# Patient Record
Sex: MALE | Race: WHITE | NOT HISPANIC OR LATINO | Employment: FULL TIME | URBAN - METROPOLITAN AREA
[De-identification: names, ages, dates, MRNs, and addresses within clinical notes are randomized per-mention and may not be internally consistent; named-entity substitution may affect disease eponyms.]

---

## 2020-11-06 ENCOUNTER — OFFICE VISIT (OUTPATIENT)
Dept: URGENT CARE | Facility: MEDICAL CENTER | Age: 30
End: 2020-11-06
Payer: OTHER MISCELLANEOUS

## 2020-11-06 PROCEDURE — G0383 LEV 4 HOSP TYPE B ED VISIT: HCPCS | Performed by: PHYSICIAN ASSISTANT

## 2020-11-06 PROCEDURE — 90471 IMMUNIZATION ADMIN: CPT | Performed by: PHYSICIAN ASSISTANT

## 2020-11-06 PROCEDURE — 90715 TDAP VACCINE 7 YRS/> IM: CPT

## 2020-11-06 PROCEDURE — 12032 INTMD RPR S/A/T/EXT 2.6-7.5: CPT

## 2020-11-06 PROCEDURE — 99284 EMERGENCY DEPT VISIT MOD MDM: CPT | Performed by: PHYSICIAN ASSISTANT

## 2020-11-09 ENCOUNTER — APPOINTMENT (OUTPATIENT)
Dept: URGENT CARE | Facility: MEDICAL CENTER | Age: 30
End: 2020-11-09
Payer: OTHER MISCELLANEOUS

## 2020-11-09 PROCEDURE — 99213 OFFICE O/P EST LOW 20 MIN: CPT | Performed by: FAMILY MEDICINE

## 2020-11-20 ENCOUNTER — APPOINTMENT (OUTPATIENT)
Dept: URGENT CARE | Facility: MEDICAL CENTER | Age: 30
End: 2020-11-20
Payer: OTHER MISCELLANEOUS

## 2020-11-20 PROCEDURE — 99213 OFFICE O/P EST LOW 20 MIN: CPT | Performed by: PHYSICIAN ASSISTANT

## 2021-06-03 ENCOUNTER — TELEPHONE (OUTPATIENT)
Dept: FAMILY MEDICINE CLINIC | Facility: CLINIC | Age: 31
End: 2021-06-03

## 2021-06-03 NOTE — TELEPHONE ENCOUNTER
lmom for patient to call back to schedule an appointment with Dr Anabel Magallanes or confirm if patient transferred care to another provider

## 2022-03-11 ENCOUNTER — TELEPHONE (OUTPATIENT)
Dept: FAMILY MEDICINE CLINIC | Facility: CLINIC | Age: 32
End: 2022-03-11

## 2022-03-11 NOTE — TELEPHONE ENCOUNTER
2nd attempt attribution call   lmom inquiring if Dr Rayshawn Napoles is still his PCP and if he is going to another facility  Told him if he would like to re-establish care to please call here and we would schedule an apt with Dr Rayshawn Napoles for him

## 2022-04-08 NOTE — TELEPHONE ENCOUNTER
Mailed Certified Attribution Letter to patient's listed address       Article Number: [id number]

## 2022-06-07 NOTE — TELEPHONE ENCOUNTER
06/07/22 10:08 AM     Thank you for your request  Your request has been received, reviewed, and the patient chart updated  The PCP has successfully been removed with a patient attribution note  This message will now be completed      Thank you  Se Garcia